# Patient Record
Sex: FEMALE | Race: WHITE | NOT HISPANIC OR LATINO | URBAN - NONMETROPOLITAN AREA
[De-identification: names, ages, dates, MRNs, and addresses within clinical notes are randomized per-mention and may not be internally consistent; named-entity substitution may affect disease eponyms.]

---

## 2020-06-18 ENCOUNTER — IMPORTED ENCOUNTER (OUTPATIENT)
Dept: URBAN - NONMETROPOLITAN AREA CLINIC 1 | Facility: CLINIC | Age: 30
End: 2020-06-18

## 2020-06-18 PROBLEM — H52.13: Noted: 2020-06-18

## 2020-06-18 PROBLEM — H52.221: Noted: 2020-06-18

## 2020-06-18 PROCEDURE — S0621 ROUTINE OPHTHALMOLOGICAL EXA: HCPCS

## 2020-06-18 PROCEDURE — 92310 CONTACT LENS FITTING OU: CPT

## 2020-06-18 NOTE — PATIENT DISCUSSION
Compound Myopic Astigmatism OD/Simple Myopia OS-  discussed findings w/patient-  new spectacle/CL Rx issued-  continue to monitor yearly or prn; 's Notes: MR 6/18/2020DFE 6/18/2020

## 2021-07-19 ENCOUNTER — IMPORTED ENCOUNTER (OUTPATIENT)
Dept: URBAN - NONMETROPOLITAN AREA CLINIC 1 | Facility: CLINIC | Age: 31
End: 2021-07-19

## 2021-07-19 PROCEDURE — 92310 CONTACT LENS FITTING OU: CPT

## 2021-07-19 PROCEDURE — 92015 DETERMINE REFRACTIVE STATE: CPT

## 2021-07-19 PROCEDURE — 92014 COMPRE OPH EXAM EST PT 1/>: CPT

## 2021-07-19 NOTE — PATIENT DISCUSSION
Compound Myopic Astigmatism OU-  discussed findings w/patient-  mild changes OS only-  new spectacle/CL Rx issued-  continue to monitor yearly or prn; 's Notes: MR 7/19/2021DFE 7/19/2021

## 2022-04-09 ASSESSMENT — VISUAL ACUITY
OS_SC: 20/20
OS_SC: 20/20-
OS_SC: 20/20
OU_SC: J1+
OD_SC: 20/30
OU_SC: J1+
OD_SC: 20/20
OD_SC: 20/20
OU_SC: 20/20
OU_SC: 20/20

## 2022-04-09 ASSESSMENT — TONOMETRY
OS_IOP_MMHG: 18
OD_IOP_MMHG: 18
OS_IOP_MMHG: 18
OD_IOP_MMHG: 18

## 2025-01-03 ENCOUNTER — COMPREHENSIVE EXAM (OUTPATIENT)
Age: 35
End: 2025-01-03

## 2025-01-03 DIAGNOSIS — H52.13: ICD-10-CM

## 2025-01-03 DIAGNOSIS — H52.221: ICD-10-CM

## 2025-01-03 PROCEDURE — 92014 COMPRE OPH EXAM EST PT 1/>: CPT

## 2025-01-03 PROCEDURE — 92310-2 LEVEL 2 SOFT LENS UPDATE

## 2025-01-03 PROCEDURE — 92015 DETERMINE REFRACTIVE STATE: CPT
